# Patient Record
Sex: FEMALE | Race: WHITE
[De-identification: names, ages, dates, MRNs, and addresses within clinical notes are randomized per-mention and may not be internally consistent; named-entity substitution may affect disease eponyms.]

---

## 2017-11-27 ENCOUNTER — HOSPITAL ENCOUNTER (INPATIENT)
Dept: HOSPITAL 74 - JER | Age: 32
LOS: 2 days | Discharge: HOME | DRG: 544 | End: 2017-11-29
Attending: OBSTETRICS & GYNECOLOGY | Admitting: OBSTETRICS & GYNECOLOGY
Payer: COMMERCIAL

## 2017-11-27 VITALS — BODY MASS INDEX: 29 KG/M2

## 2017-11-27 DIAGNOSIS — O03.37: Primary | ICD-10-CM

## 2017-11-27 DIAGNOSIS — A41.9: ICD-10-CM

## 2017-11-27 LAB
ALBUMIN SERPL-MCNC: 4 G/DL (ref 3.4–5)
ALP SERPL-CCNC: 62 U/L (ref 45–117)
ALT SERPL-CCNC: 18 U/L (ref 12–78)
ANION GAP SERPL CALC-SCNC: 11 MMOL/L (ref 8–16)
APPEARANCE UR: (no result)
APTT BLD: 29.8 SECONDS (ref 26.9–34.4)
AST SERPL-CCNC: 15 U/L (ref 15–37)
BASOPHILS # BLD: 0.4 % (ref 0–2)
BILIRUB SERPL-MCNC: 0.9 MG/DL (ref 0.2–1)
BILIRUB UR STRIP.AUTO-MCNC: NEGATIVE MG/DL
CALCIUM SERPL-MCNC: 8.6 MG/DL (ref 8.5–10.1)
CK SERPL-CCNC: 101 IU/L (ref 26–192)
CO2 SERPL-SCNC: 25 MMOL/L (ref 21–32)
COLOR UR: YELLOW
CREAT SERPL-MCNC: 0.7 MG/DL (ref 0.55–1.02)
DEPRECATED RDW RBC AUTO: 12.8 % (ref 11.6–15.6)
EOSINOPHIL # BLD: 0.2 % (ref 0–4.5)
GLUCOSE SERPL-MCNC: 101 MG/DL (ref 74–106)
INR BLD: 1.24 (ref 0.82–1.09)
KETONES UR QL STRIP: NEGATIVE
LEUKOCYTE ESTERASE UR QL STRIP.AUTO: (no result)
MCH RBC QN AUTO: 31.4 PG (ref 25.7–33.7)
MCHC RBC AUTO-ENTMCNC: 34.9 G/DL (ref 32–36)
MCV RBC: 90.1 FL (ref 80–96)
MUCOUS THREADS URNS QL MICRO: (no result)
NEUTROPHILS # BLD: 88.6 % (ref 42.8–82.8)
NITRITE UR QL STRIP: NEGATIVE
PH BLDV: 7.45 [PH] (ref 7.32–7.42)
PH UR: 5 [PH] (ref 5–8)
PLATELET # BLD AUTO: 171 K/MM3 (ref 134–434)
PMV BLD: 9 FL (ref 7.5–11.1)
PROT SERPL-MCNC: 7.2 G/DL (ref 6.4–8.2)
PROT UR QL STRIP: NEGATIVE
PROT UR QL STRIP: NEGATIVE
PT PNL PPP: 14 SEC (ref 9.98–11.88)
RBC # UR STRIP: (no result) /UL
RBC #/AREA URNS HPF: <1 /HPF (ref 0–3)
SAO2 % BLDV: 24.3 MEQ/L (ref 19–25)
SP GR UR: 1.02 (ref 1–1.03)
TROPONIN I SERPL-MCNC: < 0.02 NG/ML (ref 0–0.05)
UROBILINOGEN UR STRIP-MCNC: NEGATIVE MG/DL (ref 0.2–1)
WBC # BLD AUTO: 10.3 K/MM3 (ref 4–10)
WBC #/AREA URNS HPF: 7 /HPF (ref 3–5)

## 2017-11-27 PROCEDURE — 10D17ZZ EXTRACTION OF PRODUCTS OF CONCEPTION, RETAINED, VIA NATURAL OR ARTIFICIAL OPENING: ICD-10-PCS | Performed by: OBSTETRICS & GYNECOLOGY

## 2017-11-27 RX ADMIN — METRONIDAZOLE SCH MLS/HR: 500 INJECTION, SOLUTION INTRAVENOUS at 17:48

## 2017-11-27 RX ADMIN — METRONIDAZOLE SCH MLS/HR: 500 INJECTION, SOLUTION INTRAVENOUS at 11:30

## 2017-11-27 RX ADMIN — DOXYCYCLINE SCH MLS/HR: 100 INJECTION, POWDER, LYOPHILIZED, FOR SOLUTION INTRAVENOUS at 22:34

## 2017-11-27 NOTE — HP
Past Medical History





- Primary Care Physician


PCP:: James Caruso





- Admission


Chief Complaint: 31yo P3 with missed Ab at 6-7wks by US and fever


History of Present Illness: 





Pt was diagnosed with missed AB 4 days ago, presented to ER with c/o fever and 

abdominal cramps since last night. No SOB, chest pain, , or GI issues


History Source: Patient, Medical Record


Limitations to Obtaining History: No Limitations





- Past Medical History


CNS: No: Alzheimer's, CVA, Dementia, Migraine, Multiple Sclerosis, Peripheral 

Neuropathy, Parkinson's, Seizure, Syncope, TIA, Vertigo, Other


Cardiovascular: No: AFIB, Aneurysm, Aortic Insufficiency, Aortic Stenosis, CAD, 

CHF, Deep Vein Thrombosis, HTN, Hyperlipdemia, MI, Mitral Insufficiency, Mitral 

Stenosis, Murmur, Pulmonary Hypertension, Other


Pulmonary: No: Asthma, Bronchitis, Cancer, COPD, O2 Dependent, Pneumonia, 

Previously Intubated, Pulmonary Embolus, Pulmonary Fibrosis, Sleep Apnea, Other


Gastrointestinal: No: Ascites, Cancer, Constipation, Crohn's Disease, 

Diverticulitis, Diverticulosis, Esophageal Varices, Gastritis, GERD, GI Bleed, 

Hemorrhoids, Hiatal Hernia, Inflamatory Bowel Disease, Irritable Bowel Disease, 

Pancreatitis, Peptic Ulcer Disease, Ulcerative Colitis, Other


Hepatobiliary: No: Cirrhosis, Cholelithiasis, Cholecystitis, Choledocholithiasis

, Hepatitis A, Hepatitis B, Hepatitis C, Other


Renal/: No: Renal Failure, Renal Inusuff, BPH, Cancer, Hematuria, Hemodialysis

, Neurogenic Bladder, Renal Calculi, UTI, Other


Reproductive: No: Ectopic Pregnancy, Endometriosis, Fibroids, PID, Polycystic 

Ovary Syndrome, Postmenopausal, Other


...Para: 3


Heme/Onc: Yes: Anemia


Infectious Disease: No: AIDS, C-Diff, Herpes Zoster, HIV, MRSA, STD's, 

Tuberculosis, VREF, Other


Psych: No: Addictions, Anxiety, Bipolar, Depression, Panic, Psychosis, 

Schizophrenia, Other


Rheumatology: No: Fibromyalgia, Gout, Lupus, Rheumatoid Arthritis, Sarcoidosis, 

Vasculitis, Other


ENT: No: Allergic Rhinitis, Sinusitis, Other


Endocrine: No: Rasta's Disease, Cushing's Disease, Diabetes Insipidus, 

Diabetes Mellitus, Hyperparathyroidism, Hyperthyroidism, Hypothyroidism, 

Osteopenia, SIADH, Other





- Past Surgical History


Past Surgical History: Yes: None


Hx Myomectomy: No


Hx Transabdominal Cerclage: No





- Smoking History


Smoking history: Never smoked


Have you smoked in the past 12 months: No





- Alcohol/Substance Use


Hx Alcohol Use: No


History of Substance Use: reports: None





- Social History


Usual Living Arrangement: Yes: With Child


ADL: Independent


History of Recent Travel: No





Home Medications





- Allergies


Allergies/Adverse Reactions: 


 Allergies











Allergy/AdvReac Type Severity Reaction Status Date / Time


 


No Known Allergies Allergy   Verified 01/11/16 21:03














- Home Medications


Home Medications: 


Ambulatory Orders





Prenatal Vitamins (Sjr) - 1 tab PO DAILY 01/04/16 


Ibuprofen [Motrin -] 600 mg PO QID PRN #30 tablet 01/14/16 











Family Disease History





- Family Disease History


Family History: Denies





Review of Systems





- Review of Systems


Constitutional: reports: Chills


Eyes: reports: No Symptoms


HENT: reports: No Symptoms


Neck: reports: No Symptoms


Cardiovascular: reports: No Symptoms


Respiratory: reports: No Symptoms


Gastrointestinal: reports: No Symptoms


Genitourinary: reports: Vaginal Bleeding


Breasts: reports: No Symptoms Reported


Musculoskeletal: reports: No Symptoms


Integumentary: reports: No Symptoms


Neurological: reports: No Symptoms


Endocrine: reports: No Symptoms


Hematology/Lymphatic: reports: No Symptoms


Psychiatric: reports: No Symptoms


Pain Intensity: 2





Physical Exam-GYN


Vital Signs: 


 Vital Signs











Temperature  99.1 F   11/27/17 17:41


 


Pulse Rate  95 H  11/27/17 17:41


 


Respiratory Rate  18   11/27/17 17:41


 


Blood Pressure  113/58   11/27/17 17:41


 


O2 Sat by Pulse Oximetry (%)  98   11/27/17 12:30











Constitutional: Yes: Well Nourished, No Distress, Calm


Eyes: Yes: WNL, Conjunctiva Clear


HENT: Yes: WNL, Atraumatic, Normocephalic


Neck: Yes: WNL, Supple


Cardiovascular: Yes: WNL, Regular Rate and Rhythm


Respiratory: Yes: WNL, Regular, CTA Bilaterally


Gastrointestinal: Yes: WNL, Normal Bowel Sounds, Soft


...Rectal Exam: Yes: Deferred


Renal/: Yes: WNL


Pelvis: Yes: Bladder Non Palpable


External Genitalia: Yes: Normal


Internal Exam Deferred: No


Vaginal Exam: Yes: Bleeding (light)


Cervix: Yes: Normal


Uterus: Yes: Normal


Adnexa: Normal: Left, Right


Musculoskeletal: Yes: WNL


Extremities: Yes: WNL


Edema: No


Integumentary: Yes: WNL


Neurological: Yes: WNL, Alert, Oriented


...Motor Strength: WNL


Psychiatric: Yes: WNL, Alert, Oriented


Labs: 


 CBC, BMP





 11/27/17 05:30 





 11/27/17 05:30 











Imaging





- Results


Ultrasound: Report Reviewed





Assessment/Plan





31yo P3 with septic Ab. Pt was admitted for IV abx and planned D&C. At this 

point, the pt is stable but febrile. We are awaiting the OR availability to 

proceed with planned procedure. I discussed the risks, benefits, alternatives 

of surgery with the pt. I explained the risks of sepsis, uterine scarring, 

infertility, perforation, need for additional surgery to treat complications, 

etc. Pt verbalized her understanding.

## 2017-11-27 NOTE — PDOC
Attending Attestation





- Resident


Resident Name: Terra Louis





- ED Attending Attestation


I have performed the following: I have examined & evaluated the patient, The 

case was reviewed & discussed with the resident





- HPI


HPI: 





17 06:59


Pt had miscarriage at home Sat night, now with fever and cramps.


Pt is  M1


os closed; minimal blood in vault





- Physicial Exam


PE: 





17 07:00


agree with resident exam





- Medical Decision Making





17 07:01


Will be sighed out to day ER team; they will follow urine culture and labs and 

eval after rocephin and follow US for POCs.


Pt requires admission to ob/gyn


Her PMD is Dr. Jolly

## 2017-11-27 NOTE — OP
Operative Note





- Note:


Operative Date: 11/27/17


Pre-Operative Diagnosis: Septic Ab


Operation: Suction, D&C.


Findings: 





Opened cervical os with mild bleeding, POC on curettage


Post-Operative Diagnosis: Same as Pre-op


Surgeon: Jaems Caruso


Anesthesiologist/CRNA: Delroy Funez


Anesthesia: General


Specimens Removed: POC


Estimated Blood Loss (mls): 50


Drains, Volume Out (mls): 0


Blood Volume Replaced (mls): 0


Fluid Volume Replaced (mls): 400


Operative Report Dictated: Yes

## 2017-11-27 NOTE — EKG
Test Reason : 

Blood Pressure : ***/*** mmHG

Vent. Rate : 094 BPM     Atrial Rate : 094 BPM

   P-R Int : 160 ms          QRS Dur : 078 ms

    QT Int : 372 ms       P-R-T Axes : 056 054 042 degrees

   QTc Int : 465 ms

 

NORMAL SINUS RHYTHM

NORMAL ECG

NO PREVIOUS ECGS AVAILABLE

Confirmed by RAKESH HARGROVE MD ([id number]) on 11/27/2017 1:36:59 PM

 

Referred By:             Confirmed By:RAKESH HARGROVE MD

## 2017-11-27 NOTE — PDOC
History of Present Illness





<Brenton Banks - Last Filed: 17 07:15>





- General


History Source: Patient


Exam Limitations: No Limitations





- History of Present Illness


Initial Comments: 


This is a 32 year old  female who had a miscarriage 1.5 days ago (about 9 

weeks into her pregnancy) and who now presents with fever and lower abdominal 

pain for the past 24 hours. The pain is 6/10, worsening, constant cramping with 

sharper twinges of pain, located in the lower abdomen and radiating to the 

lower back and both flanks. She has felt feverish and took her temperature to 

be 101.5 at home about 8 hours PTA and subsequently took Advil. She has 

additionally had pounding headache radiating to the neck, lightheadedness, 

nausea, vaginal spotting, and (on Saturday) passage of clots. She denies any 

vaginal discharge, dysuria, vomiting, passing out, vision changes, chest pain, 

shortness of breath, numbness, tingling, focal weakness, or other symptoms. Her 

OB/GYN is James Caruso and she last saw him on Wednesday when he did an 

ultrasound showing no e/o fetal heart beat and showing estimated fetal age 6 

weeks based on size (for gestation of 9 weeks). Ms. Boggs opted to try 

letting the miscarriage pass naturally without intervention.





<Terar Louis - Last Filed: 17 07:46>





- General


Stated Complaint: FEVER,CHILLS


Time Seen by Provider: 17 04:59





Past History





<Brenton Banks - Last Filed: 17 07:15>





- Past Medical History


Asthma: No


Cancer: No


Cardiac Disorders: No


Diabetes: No


HTN: No


Seizures: No


Thyroid Disease: No





- Suicide/Smoking/Psychosocial Hx


Smoking History: Never smoked


Have you smoked in the past 12 months: No


Hx Alcohol Use: No


Drug/Substance Use Hx: No


Hx Substance Use Treatment: No





<Terra Louis - Last Filed: 17 07:46>





- Past Medical History


Allergies/Adverse Reactions: 


 Allergies











Allergy/AdvReac Type Severity Reaction Status Date / Time


 


No Known Allergies Allergy   Verified 16 21:03











Home Medications: 


Ambulatory Orders





Prenatal Vitamins (Sjr) - 1 tab PO DAILY 16 


Ibuprofen [Motrin -] 600 mg PO QID PRN #30 tablet 16 


Doxycycline Hyclate [Vibramycin] 100 mg PO BID 11 Days #22 capsule 17 











**Review of Systems





- Review of Systems


Constitutional: Yes: Chills, Fever, Malaise.  No: Unexplained wgt Loss


HEENTM: No: Nose Congestion, Throat Pain


Respiratory: No: Cough, Shortness of Breath


Cardiac (ROS): No: Chest Pain, Palpitations


ABD/GI: Yes: Nausea, Abdominal cramping.  No: Constipated, Diarrhea, Vomiting


: Yes: Other (vaginal spotting).  No: Burning, Dysuria


Musculoskeletal: Yes: Back Pain.  No: Neck Pain


Integumentary: No: Bruising, Rash


Neurological: Yes: Headache, Dizziness (lightheaded).  No: Numbness, Tingling, 

Weakness


Endocrine: No: Unexplained Weight Gain, Unexplained Weight Loss





<Terra Louis - Last Filed: 17 07:46>





*Physical Exam





- Vital Signs


 Last Vital Signs











Temp Pulse Resp BP Pulse Ox


 


 98.3 F   87   20   134/80   98 


 


 17 08:31  17 08:31  17 08:31  17 08:31  17 08:31














<Brenton Banks - Last Filed: 17 07:15>





- Physical Exam


General Appearance: Yes: Nourished, Appropriately Dressed, Other (appears pale 

and a bit dehydrated with dark circles around her eyes, pleasant adult female 

answering questions appropriately).  No: Apparent Distress


HEENT: positive: EOMI, THELMA, Normal ENT Inspection, Normal Voice, Hearing 

Grossly Normal.  negative: Scleral Icterus (R), Scleral Icterus (L), Nasal 

Congestion


Neck: positive: Trachea midline, Supple.  negative: Tender, Rigid


Respiratory/Chest: positive: Lungs Clear, Normal Breath Sounds.  negative: 

Respiratory Distress, Crackles, Rhonchi, Stridor, Wheezing


Cardiovascular: positive: Regular Rhythm, Tachycardia (mild).  negative: Murmur


Gastrointestinal/Abdominal: positive: Normal Bowel Sounds, Tender (moderate 

suprapubic and umbilical ttp), Soft.  negative: Organomegaly, Pulsatile Mass, 

Guarding


Musculoskeletal: positive: Normal Inspection.  negative: CVA Tenderness, 

Decreased Range of Motion, Vertebral Tenderness


Extremity: positive: Normal Capillary Refill, Normal Inspection, Normal Range 

of Motion.  negative: Tender, Cyanosis, Swelling


Integumentary: positive: Normal Color, Dry, Warm, Pale.  negative: Erythema, 

Rash, Bruising


Neurologic: positive: CNs II-XII NML intact (grossly), Fully Oriented, Alert, 

Normal Mood/Affect, Normal Response, Motor Strength 5/5, Other (gait stable)





<Terra Louis - Last Filed: 17 07:46>





ED Treatment Course





- LABORATORY


CBC & Chemistry Diagram: 


 17 08:00





 17 08:00





- ADDITIONAL ORDERS


Additional order review: 


 Laboratory  Results











  17





  07:00 05:30 05:30


 


PT with INR   


 


INR   


 


PTT (Actin FS)   


 


VBG pH   


 


POC VBG pCO2   


 


POC VBG pO2   


 


Mixed VBG HCO3   


 


Sodium   


 


Potassium   


 


Chloride   


 


Carbon Dioxide   


 


Anion Gap   


 


BUN   


 


Creatinine   


 


Creat Clearance w eGFR   


 


Random Glucose   


 


Lactic Acid   


 


Calcium   


 


Total Bilirubin   


 


AST   


 


ALT   


 


Alkaline Phosphatase   


 


Creatine Kinase   


 


Troponin I   


 


Total Protein   


 


Albumin   


 


Beta HCG, Quant   5776.4 


 


Urine Color  Yellow  


 


Urine Appearance  Slcloudy  


 


Urine pH  5.0  


 


Ur Specific Gravity  1.020  


 


Urine Protein  Negative  


 


Urine Glucose (UA)  Negative  


 


Urine Ketones  Negative  


 


Urine Blood  3+ H  


 


Urine Nitrite  Negative  


 


Urine Bilirubin  Negative  


 


Urine Urobilinogen  Negative  


 


Urine WBC (Auto)  7  


 


Urine RBC (Auto)  <1  


 


Ur Epithelial Cells  Rare  


 


Urine Mucus  Rare  


 


Blood Type    O POSITIVE


 


Antibody Screen    Negative














  17





  05:30 05:30 05:30


 


PT with INR   


 


INR   


 


PTT (Actin FS)   


 


VBG pH    7.45 H


 


POC VBG pCO2    35.6 L


 


POC VBG pO2    34.3


 


Mixed VBG HCO3    24.3


 


Sodium   138 


 


Potassium   3.2 L 


 


Chloride   102 


 


Carbon Dioxide   25 


 


Anion Gap   11 


 


BUN   9 


 


Creatinine   0.7  D 


 


Creat Clearance w eGFR   > 60 


 


Random Glucose   101 


 


Lactic Acid  1.1  


 


Calcium   8.6 


 


Total Bilirubin   0.9 


 


AST   15 


 


ALT   18 


 


Alkaline Phosphatase   62 


 


Creatine Kinase   101 


 


Troponin I   < 0.02 


 


Total Protein   7.2 


 


Albumin   4.0 


 


Beta HCG, Quant   


 


Urine Color   


 


Urine Appearance   


 


Urine pH   


 


Ur Specific Gravity   


 


Urine Protein   


 


Urine Glucose (UA)   


 


Urine Ketones   


 


Urine Blood   


 


Urine Nitrite   


 


Urine Bilirubin   


 


Urine Urobilinogen   


 


Urine WBC (Auto)   


 


Urine RBC (Auto)   


 


Ur Epithelial Cells   


 


Urine Mucus   


 


Blood Type   


 


Antibody Screen   














  17





  05:30


 


PT with INR  14.00 H


 


INR  1.24 H


 


PTT (Actin FS)  29.8


 


VBG pH 


 


POC VBG pCO2 


 


POC VBG pO2 


 


Mixed VBG HCO3 


 


Sodium 


 


Potassium 


 


Chloride 


 


Carbon Dioxide 


 


Anion Gap 


 


BUN 


 


Creatinine 


 


Creat Clearance w eGFR 


 


Random Glucose 


 


Lactic Acid 


 


Calcium 


 


Total Bilirubin 


 


AST 


 


ALT 


 


Alkaline Phosphatase 


 


Creatine Kinase 


 


Troponin I 


 


Total Protein 


 


Albumin 


 


Beta HCG, Quant 


 


Urine Color 


 


Urine Appearance 


 


Urine pH 


 


Ur Specific Gravity 


 


Urine Protein 


 


Urine Glucose (UA) 


 


Urine Ketones 


 


Urine Blood 


 


Urine Nitrite 


 


Urine Bilirubin 


 


Urine Urobilinogen 


 


Urine WBC (Auto) 


 


Urine RBC (Auto) 


 


Ur Epithelial Cells 


 


Urine Mucus 


 


Blood Type 


 


Antibody Screen 








 











  17





  05:30


 


RBC  4.08


 


MCV  90.1


 


MCHC  34.9


 


RDW  12.8


 


MPV  9.0


 


Neutrophils %  88.6 H D


 


Lymphocytes %  3.7 L D


 


Monocytes %  7.1


 


Eosinophils %  0.2  D


 


Basophils %  0.4














- RADIOLOGY


Radiology Studies Ordered: 














 Category Date Time Status


 


 TRANSVAGINAL ULTRASOUND US [US] Stat Ultrasound  17 07:39 Completed














- Medications


Given in the ED: 


ED Medications














Discontinued Medications














Generic Name Dose Route Start Last Admin





  Trade Name Mer  PRN Reason Stop Dose Admin


 


Acetaminophen  1,000 mg  17 05:39  17 06:13





  Ofirmev Injection -  IVPB  17 05:40  1,000 mg





  ONCE ONE   Administration


 


Ceftriaxone Sodium 1,000 mg/  50 mls @ 100 mls/hr  17 06:58  17 07:

02





  Dextrose  IVPB  17 07:27  100 mls/hr





  ONCE ONE   Administration


 


Ondansetron HCl  4 mg  17 05:39  17 06:16





  Zofran Injection  IVPUSH  17 05:40  4 mg





  NOW ONE   Administration


 


Potassium Chloride  40 meq  17 06:34  17 06:47





  K-Dur -  PO  17 06:35  40 meq





  ONCE ONE   Administration


 


Sodium Chloride  1,000 ml  17 05:22  17 06:03





  Normal Saline -  IV  17 05:23  1,000 ml





  ONCE ONE   Administration














<Brenotn Banks - Last Filed: 17 07:15>





- LABORATORY


CBC & Chemistry Diagram: 


 17 08:00





 17 08:00





<Terra Louis - Last Filed: 17 07:46>





Medical Decision Making





- Medical Decision Making


32 YOF with current known miscarriage at 9 wks gestation (6 wk size fetus per 

her report).


She presents with abdominal pain, fever, nausea, headache, lightheadedness, 

light vaginal spotting.


On initial vitals she has mild tachycardia (105) and is febrile (up to 102.2 

initially).


On exam she has moderate suprapubic and umbilical ttp, appears a bit pale.


DDX IBNLT septic , endometritis, UTI/pyelo, influenza or other viral 

syndrome.


Ordered is sepsis order set with CBCD, CMP, coags, T&S, cardiac panel, lactate, 

blood cx, UA cx.


Also CXR, EKG, hCG quant, sxs control with IV Tylenol, Zofran, IVF.





Labs result with mildly elevated WBC, nl H/H, INR 1.24, blood and leukocyte 

esterase + on UA.


Potassium replaced.


Patient's care is signed out to Dr. Banks at the end of my shift awaiting 

pelvic US.





<MissyTerra - Last Filed: 17 07:46>





*DC/Admit/Observation/Transfer





- Discharge Dispostion


Admit: Yes





<Brenton Banks - Last Filed: 17 07:15>





<MissyTerra - Last Filed: 17 07:46>


Diagnosis at time of Disposition: 


 Missed  with fetal demise before 20 completed weeks of gestation, 

Sepsis due to incomplete spontaneous 








- Discharge Dispostion


Disposition: HOME


Condition at time of disposition: Good

## 2017-11-27 NOTE — PDOC
*Physical Exam





- Vital Signs


 Last Vital Signs











Temp Pulse Resp BP Pulse Ox


 


 98.3 F   87   20   134/80   98 


 


 17 08:31  17 08:31  17 08:31  17 08:31  17 08:31














ED Treatment Course





- LABORATORY


CBC & Chemistry Diagram: 


 17 05:30





 17 05:30





- ADDITIONAL ORDERS


Additional order review: 


 Laboratory  Results











  17





  07:00 05:30 05:30


 


PT with INR   


 


INR   


 


PTT (Actin FS)   


 


VBG pH   


 


POC VBG pCO2   


 


POC VBG pO2   


 


Mixed VBG HCO3   


 


Sodium   


 


Potassium   


 


Chloride   


 


Carbon Dioxide   


 


Anion Gap   


 


BUN   


 


Creatinine   


 


Creat Clearance w eGFR   


 


Random Glucose   


 


Lactic Acid   


 


Calcium   


 


Total Bilirubin   


 


AST   


 


ALT   


 


Alkaline Phosphatase   


 


Creatine Kinase   


 


Troponin I   


 


Total Protein   


 


Albumin   


 


Beta HCG, Quant   5776.4 


 


Urine Color  Yellow  


 


Urine Appearance  Slcloudy  


 


Urine pH  5.0  


 


Ur Specific Gravity  1.020  


 


Urine Protein  Negative  


 


Urine Glucose (UA)  Negative  


 


Urine Ketones  Negative  


 


Urine Blood  3+ H  


 


Urine Nitrite  Negative  


 


Urine Bilirubin  Negative  


 


Urine Urobilinogen  Negative  


 


Blood Type    O POSITIVE


 


Antibody Screen    Negative














  17





  05:30 05:30 05:30


 


PT with INR   


 


INR   


 


PTT (Actin FS)   


 


VBG pH    7.45 H


 


POC VBG pCO2    35.6 L


 


POC VBG pO2    34.3


 


Mixed VBG HCO3    24.3


 


Sodium   138 


 


Potassium   3.2 L 


 


Chloride   102 


 


Carbon Dioxide   25 


 


Anion Gap   11 


 


BUN   9 


 


Creatinine   0.7  D 


 


Creat Clearance w eGFR   > 60 


 


Random Glucose   101 


 


Lactic Acid  1.1  


 


Calcium   8.6 


 


Total Bilirubin   0.9 


 


AST   15 


 


ALT   18 


 


Alkaline Phosphatase   62 


 


Creatine Kinase   101 


 


Troponin I   < 0.02 


 


Total Protein   7.2 


 


Albumin   4.0 


 


Beta HCG, Quant   


 


Urine Color   


 


Urine Appearance   


 


Urine pH   


 


Ur Specific Gravity   


 


Urine Protein   


 


Urine Glucose (UA)   


 


Urine Ketones   


 


Urine Blood   


 


Urine Nitrite   


 


Urine Bilirubin   


 


Urine Urobilinogen   


 


Blood Type   


 


Antibody Screen   














  17





  05:30


 


PT with INR  14.00 H


 


INR  1.24 H


 


PTT (Actin FS)  29.8


 


VBG pH 


 


POC VBG pCO2 


 


POC VBG pO2 


 


Mixed VBG HCO3 


 


Sodium 


 


Potassium 


 


Chloride 


 


Carbon Dioxide 


 


Anion Gap 


 


BUN 


 


Creatinine 


 


Creat Clearance w eGFR 


 


Random Glucose 


 


Lactic Acid 


 


Calcium 


 


Total Bilirubin 


 


AST 


 


ALT 


 


Alkaline Phosphatase 


 


Creatine Kinase 


 


Troponin I 


 


Total Protein 


 


Albumin 


 


Beta HCG, Quant 


 


Urine Color 


 


Urine Appearance 


 


Urine pH 


 


Ur Specific Gravity 


 


Urine Protein 


 


Urine Glucose (UA) 


 


Urine Ketones 


 


Urine Blood 


 


Urine Nitrite 


 


Urine Bilirubin 


 


Urine Urobilinogen 


 


Blood Type 


 


Antibody Screen 








 











  17





  05:30


 


RBC  4.08


 


MCV  90.1


 


MCHC  34.9


 


RDW  12.8


 


MPV  9.0


 


Neutrophils %  88.6 H D


 


Lymphocytes %  3.7 L D


 


Monocytes %  7.1


 


Eosinophils %  0.2  D


 


Basophils %  0.4














- RADIOLOGY


Radiology Studies Ordered: 














 Category Date Time Status


 


 TRANSVAGINAL ULTRASOUND US [US] Stat Ultrasound  17 07:39 Completed














- Medications


Given in the ED: 


ED Medications














Discontinued Medications














Generic Name Dose Route Start Last Admin





  Trade Name Freq  PRN Reason Stop Dose Admin


 


Acetaminophen  1,000 mg  17 05:39  17 06:13





  Ofirmev Injection -  IVPB  17 05:40  1,000 mg





  ONCE ONE   Administration


 


Ceftriaxone Sodium 1,000 mg/  50 mls @ 100 mls/hr  17 06:58  17 07:

02





  Dextrose  IVPB  17 07:27  100 mls/hr





  ONCE ONE   Administration


 


Ondansetron HCl  4 mg  17 05:39  17 06:16





  Zofran Injection  IVPUSH  17 05:40  4 mg





  NOW ONE   Administration


 


Potassium Chloride  40 meq  17 06:34  17 06:47





  K-Dur -  PO  17 06:35  40 meq





  ONCE ONE   Administration


 


Sodium Chloride  1,000 ml  17 05:22  17 06:03





  Normal Saline -  IV  17 05:23  1,000 ml





  ONCE ONE   Administration














Medical Decision Making





- Medical Decision Making





17 09:36


transvaginal u/s: \


Intrauterine gestational sac and fetal pole with an estimated gestational age 

of 6 weeks 5


days. No fetal heart activity could be documented and there is mild 

irregularity of the


gestational sac compatible with fetal demise.


Correlation with serum beta hCG level is needed.


Will speak to dr Caruso to teto if D&C is warranted. 





*DC/Admit/Observation/Transfer


Diagnosis at time of Disposition: 


 Missed  with fetal demise before 20 completed weeks of gestation, 

Sepsis due to incomplete spontaneous 








- Referrals


Referrals: 


James Caruso MD [Staff Physician] - 


Marquis Machado MD [Primary Care Provider] - 





- Patient Instructions





- Post Discharge Activity

## 2017-11-28 LAB
ALBUMIN SERPL-MCNC: 3.1 G/DL (ref 3.4–5)
ALP SERPL-CCNC: 47 U/L (ref 45–117)
ALT SERPL-CCNC: 17 U/L (ref 12–78)
ANION GAP SERPL CALC-SCNC: 6 MMOL/L (ref 8–16)
AST SERPL-CCNC: 15 U/L (ref 15–37)
BASOPHILS # BLD: 0.1 % (ref 0–2)
BILIRUB SERPL-MCNC: 0.6 MG/DL (ref 0.2–1)
CALCIUM SERPL-MCNC: 8.3 MG/DL (ref 8.5–10.1)
CO2 SERPL-SCNC: 27 MMOL/L (ref 21–32)
CREAT SERPL-MCNC: 0.6 MG/DL (ref 0.55–1.02)
DEPRECATED RDW RBC AUTO: 12.7 % (ref 11.6–15.6)
EOSINOPHIL # BLD: 0 % (ref 0–4.5)
GLUCOSE SERPL-MCNC: 137 MG/DL (ref 74–106)
MAGNESIUM SERPL-MCNC: 1.9 MG/DL (ref 1.8–2.4)
MCH RBC QN AUTO: 30.8 PG (ref 25.7–33.7)
MCHC RBC AUTO-ENTMCNC: 33.7 G/DL (ref 32–36)
MCV RBC: 91.4 FL (ref 80–96)
NEUTROPHILS # BLD: 88.8 % (ref 42.8–82.8)
PLATELET # BLD AUTO: 160 K/MM3 (ref 134–434)
PMV BLD: 9.1 FL (ref 7.5–11.1)
PROT SERPL-MCNC: 6.4 G/DL (ref 6.4–8.2)
WBC # BLD AUTO: 7.4 K/MM3 (ref 4–10)

## 2017-11-28 RX ADMIN — DOXYCYCLINE SCH MLS/HR: 100 INJECTION, POWDER, LYOPHILIZED, FOR SOLUTION INTRAVENOUS at 09:45

## 2017-11-28 RX ADMIN — METRONIDAZOLE SCH MLS/HR: 500 INJECTION, SOLUTION INTRAVENOUS at 18:31

## 2017-11-28 RX ADMIN — DOXYCYCLINE SCH MLS/HR: 100 INJECTION, POWDER, LYOPHILIZED, FOR SOLUTION INTRAVENOUS at 21:13

## 2017-11-28 RX ADMIN — Medication SCH TAB: at 11:10

## 2017-11-28 RX ADMIN — CEFTRIAXONE SCH MLS/HR: 1 INJECTION, SOLUTION INTRAVENOUS at 11:06

## 2017-11-28 RX ADMIN — METRONIDAZOLE SCH MLS/HR: 500 INJECTION, SOLUTION INTRAVENOUS at 02:00

## 2017-11-28 RX ADMIN — METRONIDAZOLE SCH MLS/HR: 500 INJECTION, SOLUTION INTRAVENOUS at 18:29

## 2017-11-28 RX ADMIN — METRONIDAZOLE SCH MLS/HR: 500 INJECTION, SOLUTION INTRAVENOUS at 13:42

## 2017-11-28 RX ADMIN — METRONIDAZOLE SCH MLS/HR: 500 INJECTION, SOLUTION INTRAVENOUS at 06:22

## 2017-11-28 NOTE — PN
Progress Note (short form)





- Note


Progress Note: 





Anesthesia Post op


Pt seen and examined


S:alert and awake


O:








 Vital Signs











Temperature  98.0 F   17 06:00


 


Pulse Rate  66   17 06:00


 


Respiratory Rate  18   17 06:00


 


Blood Pressure  98/53   17 06:00


 


O2 Sat by Pulse Oximetry (%)  98   17 22:15








 CBC, BMP





 17 08:00 





 17 08:00 








A/P:Current Active Problems





Missed  with fetal demise before 20 completed weeks of gestation (Acute)


Sepsis due to incomplete spontaneous  (Acute)


s/p D n C


Doing well post op


Continue current care


viktor Araya MD

## 2017-11-28 NOTE — OP
DATE OF OPERATION:  2017

 

PREOPERATIVE DIAGNOSIS:  Septic  at approximately 7 weeks of estimated

gestational age.

 

POSTOPERATIVE DIAGNOSIS:  Septic  at approximately 7 weeks of estimated

gestational age.

 

PROCEDURE:  Suction dilatation and curettage.

 

SURGEON:  James Caruso MD

 

ANESTHESIOLOGIST:  Delroy Funez MD

 

ANESTHESIA:  General LMA.

 

COMPLICATIONS:  None.

 

ESTIMATED BLOOD LOSS:  50 mL

 

INTRAVENOUS FLUIDS:  400 mL of crystalloids.

 

PATHOLOGY:  Products of conception.

 

FINDINGS:  Examination under anesthesia revealed a small anteverted uterus with an

open cervical os.  Mild vaginal bleeding was noted from the cervix.  Products of

conception were noted at suction curettage.  No retained products of conception were

noted at the end of the procedure.

 

DESCRIPTION OF PROCEDURE:  The patient was met preoperatively.  The risks, benefits,

and alternatives of surgery were discussed in details.  All questions were answered. 

The patient was then brought to the OR with the IV running.  She was placed on the

surgical table in the supine position.  The general anesthesia was achieved without

difficulty.  The patient was then placed in a dorsal lithotomy position using

adjustable Michele stirrups.  She was examined under anesthesia with the findings as

described above.  The patient was then prepped and draped in the usual sterile

fashion.  A sterile speculum was introduced inside the vagina.  The anterior cervical

lip was grasped with polyp forceps.  An 8-mm suction curette was introduced carefully

into the uterine cavity.  The cervix did not need to be dilated.  A suction curettage

was performed to remove all of the products of conception.  Once the suction

curettage was completed, the uterine cavity was explored with the sharp curette to

confirm no retained products of conception.  Since there were no retained products of

conception, no sharp curettage was necessary.  All of the instruments were removed

from the patient.  Good hemostasis was confirmed.  Sponge, lap, and needle counts

were correct.  The patient was transferred to recovery room in stable condition.

 

 

OMA [id number]

DD: 2017 20:36

DT: 2017 10:20

Job #:  [id number]

## 2017-11-28 NOTE — PN
Progress Note (SOAP)





- Subjective


Chief Complaint: 





No complaints, feels better


History of Present Illness: 





s/p D&C for missed/septic Ab





- Current Medications


Current Medications: 


Active Medications





Acetaminophen (Ofirmev Injection -)  1,000 mg IVPB Q6H PRN


   PRN Reason: FEVER OR PAIN


   Last Admin: 11/27/17 20:45 Dose:  1,000 mg


Hydromorphone HCl (Dilaudid Injection -)  0.5 mg IVPB [id number] PRN


   PRN Reason: PAIN


CEFTRIAXONE 1 G/50 ML PREMIX (Ceftriaxone 1 Gm-D5w Bag)  50 mls @ 100 mls/hr 

IVPB DAILY [facility]


Doxycycline Hyclate 100 mg/ (Dextrose)  100 mls @ 100 mls/hr IVPB BID [facility]


   Last Admin: 11/27/17 22:34 Dose:  100 mls/hr


Dextrose/Sodium Chloride (D5-Ns -)  1,000 mls @ 125 mls/hr IV ASDIR [facility]


   Last Admin: 11/28/17 05:00 Dose:  125 mls/hr


Metronidazole (Flagyl 500mg Premixed Ivpb -)  500 mg in 100 mls @ 100 mls/hr 

IVPB Q6H [facility]


   Last Admin: 11/28/17 06:22 Dose:  100 mls/hr


Ibuprofen (Motrin -)  600 mg PO QID PRN


   PRN Reason: PAIN OR FEVER


   Last Admin: 11/28/17 08:14 Dose:  600 mg


Ondansetron HCl (Zofran Injection)  4 mg IVPUSH Q6H PRN


   PRN Reason: NAUSEA AND/OR VOMITING


Prenatal Multivit/Folic Acid/Iron (Prenatal Vitamins (Sjr) -)  1 tab PO DAILY 

[facility]











- Objective


Vital Signs: 


 Vital Signs











Temperature  98.0 F   11/28/17 06:00


 


Pulse Rate  66   11/28/17 06:00


 


Respiratory Rate  18   11/28/17 06:00


 


Blood Pressure  98/53   11/28/17 06:00


 


O2 Sat by Pulse Oximetry (%)  98   11/27/17 22:15











Constitutional: Yes: Well Nourished, No Distress, Calm


Eyes: Yes: WNL, Conjunctiva Clear


HENT: Yes: WNL, Atraumatic, Normocephalic


Neck: Yes: WNL, Supple, Trachea Midline


Cardiovascular: Yes: WNL, Regular Rate and Rhythm


Respiratory: Yes: WNL, Regular, CTA Bilaterally


Gastrointestinal: Yes: WNL, Normal Bowel Sounds, Soft


Genitourinary: Yes: WNL


Musculoskeletal: Yes: WNL


Extremities: Yes: WNL


Peripheral Pulses WNL: No


Edema: No


Integumentary: Yes: WNL


Neurological: Yes: WNL, Alert, Oriented


...Motor Strength: Yes: WNL


Psychiatric: Yes: WNL, Alert, Oriented





Assessment/Plan





31yo P3 s/p D&C for missed/septic Ab. The pt is clinically improving, feels 

well. She is afebrile x 12hrs.


Plan to continue IV abx until afebrile, at least 24hrs.


Check CBC.

## 2017-11-29 VITALS — SYSTOLIC BLOOD PRESSURE: 126 MMHG | TEMPERATURE: 98.5 F | DIASTOLIC BLOOD PRESSURE: 79 MMHG | HEART RATE: 59 BPM

## 2017-11-29 RX ADMIN — METRONIDAZOLE SCH MLS/HR: 500 INJECTION, SOLUTION INTRAVENOUS at 00:29

## 2017-11-29 RX ADMIN — Medication SCH TAB: at 10:15

## 2017-11-29 RX ADMIN — METRONIDAZOLE SCH MLS/HR: 500 INJECTION, SOLUTION INTRAVENOUS at 06:30

## 2017-11-29 RX ADMIN — CEFTRIAXONE SCH MLS/HR: 1 INJECTION, SOLUTION INTRAVENOUS at 09:20

## 2017-11-29 RX ADMIN — METRONIDAZOLE SCH MLS/HR: 500 INJECTION, SOLUTION INTRAVENOUS at 12:17

## 2017-11-29 RX ADMIN — DOXYCYCLINE SCH MLS/HR: 100 INJECTION, POWDER, LYOPHILIZED, FOR SOLUTION INTRAVENOUS at 10:08

## 2017-11-29 NOTE — DS
Physical Exam-GYN


Vital Signs: 


 Vital Signs











Temperature  98.3 F   17 22:00


 


Pulse Rate  61   17 22:00


 


Respiratory Rate  18   17 22:00


 


Blood Pressure  116/70   17 22:00


 


O2 Sat by Pulse Oximetry (%)  98   17 22:15











Constitutional: Yes: Well Nourished, No Distress, Calm


Eyes: Yes: WNL, Conjunctiva Clear, EOM Intact


HENT: Yes: WNL, Atraumatic, Normocephalic


Neck: Yes: WNL, Supple, Trachea Midline


Cardiovascular: Yes: WNL, Regular Rate and Rhythm


Respiratory: Yes: WNL, Regular, CTA Bilaterally


Gastrointestinal: Yes: WNL, Normal Bowel Sounds, Soft


Renal/: Yes: WNL


Pelvis: Yes: WNL


External Genitalia: Yes: Normal


Vaginal Exam: Yes: Normal


Cervix: Yes: Normal


Uterus: Yes: Normal, Freely Moveable, Anteverted


Musculoskeletal: Yes: WNL


Extremities: Yes: WNL


Edema: No


Integumentary: Yes: WNL


Neurological: Yes: WNL, Alert, Oriented


...Motor Strength: WNL


Psychiatric: Yes: WNL, Alert, Oriented


Labs: 


 CBC, BMP





 17 08:00 





 17 08:00 











Discharge Summary


Reason For Visit: SEPSIS,MISSED  W FETAL DEMISE BEFORE 20WKS


Current Active Problems





Missed  with fetal demise before 20 completed weeks of gestation (Acute)


Sepsis due to incomplete spontaneous  (Acute)








Procedures: Principal: D&C


Hospital Course: 





Normal recovery


Condition: Good





- Instructions


Diet, Activity, Other Instructions: 


Dr. James Caruso Gyn discharge instructions 





Physical activity





Resume your normal everyday activity as tolerated no heavy lifting or exercise 

until seen by your surgeon. You may walk unlimited zion of and climb stairs. 

You may resume driving the car when you feel safe and comfortable behind the 

wheel. No sexual activity as instructed by Dr. Caruso.





Wound care


If you have a bandage, leave it on, and keep dry for 48-72 hours. After that 

time discard the outer bandage. If they are tapes on the skin under the out of 

bandage leave them in place. They will peel off in the next 7 to 10 days. Do 

Not Peel them off. You may shower the day after surgery. If there are tapes 

present on the skin, you may shower over them.





Diet


There are no dietary restrictions. Eat healthy, high-fiber foods. Drink 6 to 8 

glasses of liquid each day. This will assist in keeping your bowels are regular.





Pain management


You may take Tylenol or acetaminophen or Ibuprofen (for example, Motrin, Advil 

etc.) from my pain prescription medication is ordered should be taken as 

prescribed for moderate to severe pain.





Call Dr. Caruso for any of the following:





Severe pain not relieved by medication


Fever of 101 or higher


Excessive bleeding or drainage on dressing


Inability to urinate








Call the office at [phone] for an appointment in seven days.








Referrals: 


James Caruso MD [Staff Physician] - 


Marquis Machado MD [Primary Care Provider] - 


Disposition: HOME





- Home Medications


Comprehensive Discharge Medication List: 


Ambulatory Orders





Prenatal Vitamins (Sjr) - 1 tab PO DAILY 16 


Ibuprofen [Motrin -] 600 mg PO QID PRN #30 tablet 16 


Doxycycline Hyclate [Vibramycin] 100 mg PO BID 11 Days #22 capsule 17

## 2017-11-29 NOTE — PATH
Surgical Pathology Report



Patient Name:  TERESA RG

Accession #:  [id number]

Med. Rec. #:  [id number]                                                        

   /Age/Gender:  1985 (Age: 32) / F

Account:  [id number]                                                          

             Location: [facility] OBS/GYN

Taken:  2017

Received:  2017

Reported:  2017

Physicians:  James Caruso M.D.

  



Specimen(s) Received

 UTERINE CONTENTS 





Clinical History

Septic 







Final Diagnosis

UTERINE CONTENTS, DILATATION AND CURETTAGE:

IMMATURE CHORIONIC VILLI AND DECIDUA CONSISTENT WITH PRODUCTS OF CONCEPTION.





***Electronically Signed***

Yumiko Robledo M.D.





Gross Description

Received in formalin labeled "uterine contents," is a 12.0 x 7.5 x 1.0 cm

aggregate of tan red soft tissue fragments. Villous tissue is identified. No

fetal somatic tissue is identified. A representative portion is submitted in one

cassette.

/2017

## 2017-11-29 NOTE — PN
Progress Note (SOAP)





- Subjective


Chief Complaint: 





No complaints


History of Present Illness: 





POD#2 s/p D&C for missed/septic Ab





- Current Medications


Current Medications: 


Active Medications





Acetaminophen (Ofirmev Injection -)  1,000 mg IVPB Q6H PRN


   PRN Reason: FEVER OR PAIN


   Last Admin: 11/27/17 20:45 Dose:  1,000 mg


Hydromorphone HCl (Dilaudid Injection -)  0.5 mg IVPB [id number] PRN


   PRN Reason: PAIN


CEFTRIAXONE 1 G/50 ML PREMIX (Ceftriaxone 1 Gm-D5w Bag)  50 mls @ 100 mls/hr 

IVPB DAILY [facility]


   Last Admin: 11/28/17 11:06 Dose:  100 mls/hr


Doxycycline Hyclate 100 mg/ (Dextrose)  100 mls @ 100 mls/hr IVPB BID [facility]


   Last Admin: 11/28/17 21:13 Dose:  100 mls/hr


Dextrose/Sodium Chloride (D5-Ns -)  1,000 mls @ 125 mls/hr IV ASDIR [facility]


   Last Admin: 11/28/17 05:00 Dose:  125 mls/hr


Metronidazole (Flagyl 500mg Premixed Ivpb -)  500 mg in 100 mls @ 100 mls/hr 

IVPB Q6H [facility]


   Last Admin: 11/29/17 06:30 Dose:  100 mls/hr


Ibuprofen (Motrin -)  600 mg PO QID PRN


   PRN Reason: PAIN OR FEVER


   Last Admin: 11/28/17 08:14 Dose:  600 mg


Ondansetron HCl (Zofran Injection)  4 mg IVPUSH Q6H PRN


   PRN Reason: NAUSEA AND/OR VOMITING


Prenatal Multivit/Folic Acid/Iron (Prenatal Vitamins (Sjr) -)  1 tab PO DAILY 

[facility]


   Last Admin: 11/28/17 11:10 Dose:  1 tab











- Objective


Vital Signs: 


 Vital Signs











Temperature  98.3 F   11/28/17 22:00


 


Pulse Rate  61   11/28/17 22:00


 


Respiratory Rate  18   11/28/17 22:00


 


Blood Pressure  116/70   11/28/17 22:00


 


O2 Sat by Pulse Oximetry (%)  98   11/27/17 22:15











Constitutional: Yes: Well Nourished, No Distress, Calm


Eyes: Yes: WNL, Conjunctiva Clear


HENT: Yes: WNL, Atraumatic, Normocephalic


Neck: Yes: WNL, Supple


Cardiovascular: Yes: WNL, Regular Rate and Rhythm


Respiratory: Yes: WNL, Regular, CTA Bilaterally


Gastrointestinal: Yes: WNL, Normal Bowel Sounds, Soft


...Rectal Exam: Yes: WNL


Genitourinary: Yes: WNL


Musculoskeletal: Yes: WNL


Extremities: Yes: WNL


Edema: No


Integumentary: Yes: WNL


Neurological: Yes: WNL, Alert, Oriented


...Motor Strength: Yes: WNL


Psychiatric: Yes: WNL, Alert, Oriented





Labs


Lab Results: 


 CBC, BMP





 11/28/17 08:00 





 11/28/17 08:00 











Assessment/Plan





33yo P3 s/p D&C for missed/septic Ab. The pt is doing weel. She is afebrile > 

36hrs.


Plan to d/c home on oral abx

## 2018-02-04 ENCOUNTER — HOSPITAL ENCOUNTER (EMERGENCY)
Dept: HOSPITAL 74 - FER | Age: 33
Discharge: HOME | End: 2018-02-04
Payer: COMMERCIAL

## 2018-02-04 VITALS — TEMPERATURE: 98.3 F | DIASTOLIC BLOOD PRESSURE: 85 MMHG | SYSTOLIC BLOOD PRESSURE: 131 MMHG | HEART RATE: 72 BPM

## 2018-02-04 VITALS — BODY MASS INDEX: 29.7 KG/M2

## 2018-02-04 DIAGNOSIS — L03.115: Primary | ICD-10-CM

## 2018-02-04 NOTE — PDOC
History of Present Illness





- General


Chief Complaint: Rash


Stated Complaint: RIGHT THIGH REDNESS


Time Seen by Provider: 02/04/18 15:08


History Source: Patient


Exam Limitations: No Limitations





- History of Present Illness


Initial Comments: 





02/04/18 15:29


The patient is a 32F with a PMH who presents with a rash. The patient states 

that she had a bite by an unknown insect. Today it spread across her R inner 

thigh and she went to an urgent care where they gave her 3 antibiotics and 

discharged her with the instructions of going to the ER if the rash spreads. 

She said she thought the rash spread and she came to the ER. She denies any 

fever, chills, nausea, vomiting, pain, or draining. She says she took the 3 

antibiotics around 1300 today.





Past History





- Past Medical History


Allergies/Adverse Reactions: 


 Allergies











Allergy/AdvReac Type Severity Reaction Status Date / Time


 


No Known Allergies Allergy   Verified 02/04/18 15:01











Home Medications: 


Ambulatory Orders





NK [No Known Home Medication]  02/04/18 








Asthma: No


Cancer: No


Cardiac Disorders: No


COPD: No


Diabetes: No


HTN: No


Seizures: No


Thyroid Disease: No





- Immunization History


Immunization Up to Date: Yes





- Suicide/Smoking/Psychosocial Hx


Smoking History: Never smoked


Have you smoked in the past 12 months: No


Hx Alcohol Use: Yes


Drug/Substance Use Hx: No


Substance Use Type: Alcohol


Hx Substance Use Treatment: No





**Review of Systems





- Review of Systems


Able to Perform ROS?: Yes


Comments:: 





02/04/18 15:32


GENERAL/CONSTITUTIONAL: No fever or chills. No weakness.


HEAD, EYES, EARS, NOSE AND THROAT: No change in vision. No ear pain or 

discharge. No sore throat.


CARDIOVASCULAR: No chest pain, palpitations, or lightheadedness.


RESPIRATORY: No cough, wheezing, shortness of breath, or hemoptysis.


GASTROINTESTINAL: No nausea, vomiting, diarrhea, constipation, or abdominal 

pain. 


GENITOURINARY: No dysuria, frequency, hematuria, or change in urination.


MUSCULOSKELETAL: No joint or muscle swelling or pain. No neck or back pain.


SKIN: Positive for rash. No lesions. 


NEUROLOGIC: No headache, numbness, tingling, weakness, loss of consciousness, 

or change in strength/sensation.


ENDOCRINE: No increased thirst. No abnormal weight change.


HEMATOLOGIC/LYMPHATIC: No anemia, easy bleeding, or history of blood clots.


ALLERGIC/IMMUNOLOGIC: No hives or skin allergy.


Is the patient limited English proficient: No





*Physical Exam





- Vital Signs


 Last Vital Signs











Temp Pulse Resp BP Pulse Ox


 


 98.3 F   72   16   131/85   100 


 


 02/04/18 15:01  02/04/18 15:01  02/04/18 15:01  02/04/18 15:01  02/04/18 15:01














- Physical Exam


Comments: 





02/04/18 15:32


GENERAL: Well developed, well nourished. Awake and alert. No acute distress.


HEENT: Normocephalic, atraumatic. Hearing grossly normal. Moist mucous 

membranes. PERRLA, EOMI. No conjunctival pallor. Sclera are non-icteric. 


NECK:  Full ROM. No JVD. 


CARDIOVASCULAR: Regular rate and rhythm. No murmurs, rubs, or gallops.


PULMONARY: No evidence of respiratory distress. Lungs clear to auscultation 

bilaterally. No wheezing, rales or rhonchi.


ABDOMINAL: Soft. Non-tender. Non-distended. No rebound or guarding. 


GENITOURINARY: No CVA tenderness bilaterally.


MUSCULOSKELETAL: Normal range of motion at all joints. No bony deformities or 

tenderness. 


EXTREMITIES: No cyanosis. No clubbing. No edema. No calf tenderness.


SKIN: 24cm x 14cm erythematous rash around R inner thigh, no involvement of the 

genitalia. Small area of induration at the center of the rash. Otherwise, warm 

and dry. Normal capillary refill. No jaundice. 


NEUROLOGICAL: Alert, awake, appropriate. Cranial nerves 2-12 intact. Normal 

speech. Gait is normal without ataxia.


PSYCHIATRIC: Cooperative. Good eye contact. Appropriate mood and affect.








Medical Decision Making





- Medical Decision Making





02/04/18 15:34


The patient is a 32F with no PMH who presents with a rash on her R leg. She is 

taking 3 antibiotics from the urgent care. I have reassured her and told her to 

continue taking the abx. I informed her to return to the ER if she has any fever

, chills, nausea, vomiting, spreading of the rash more than 2 inches from the 

marked area and/or drainage.





*DC/Admit/Observation/Transfer


Diagnosis at time of Disposition: 


Cellulitis


Qualifiers:


 Site of cellulitis: extremity Site of cellulitis of extremity: lower extremity 

Laterality: right Qualified Code(s): L03.115 - Cellulitis of right lower limb








- Discharge Dispostion


Disposition: HOME


Condition at time of disposition: Stable


Admit: No





- Referrals





- Patient Instructions


Printed Discharge Instructions:  DI for Cellulitis -- Adult


Additional Instructions: 


Please return to the ER if symptoms persist, worsen, or new symptoms arise.





Please follow up with your primary care physician in 2-3 days. 





Please return to the ER if you have any signs or symptoms of chest pain, 

shortness of breath, uncontrollable fever, chills, nausea, vomiting, numbness, 

tingling, or weakness in any part of your body, changes in vision, or slurred 

speech.





Please take your medications as prescribed.








- Post Discharge Activity

## 2018-02-04 NOTE — PDOC
History of Present Illness





- General


Chief Complaint: Rash


Stated Complaint: RIGHT THIGH REDNESS


Time Seen by Provider: 02/04/18 15:08





Past History





- Past Medical History


Allergies/Adverse Reactions: 


 Allergies











Allergy/AdvReac Type Severity Reaction Status Date / Time


 


No Known Allergies Allergy   Verified 02/04/18 15:01











Home Medications: 


Ambulatory Orders





NK [No Known Home Medication]  02/04/18 








Asthma: No


Cancer: No


Cardiac Disorders: No


COPD: No


Diabetes: No


HTN: No


Seizures: No


Thyroid Disease: No





- Immunization History


Immunization Up to Date: Yes





- Suicide/Smoking/Psychosocial Hx


Smoking History: Never smoked


Have you smoked in the past 12 months: No


Hx Alcohol Use: Yes


Drug/Substance Use Hx: No


Substance Use Type: Alcohol


Hx Substance Use Treatment: No





*Physical Exam





- Vital Signs


 Last Vital Signs











Temp Pulse Resp BP Pulse Ox


 


          0/0    


 


          02/04/18 15:01   














*DC/Admit/Observation/Transfer





- Discharge Dispostion


Condition at time of disposition: Stable





- Referrals





- Patient Instructions





- Post Discharge Activity